# Patient Record
Sex: MALE | Race: BLACK OR AFRICAN AMERICAN | Employment: FULL TIME | URBAN - METROPOLITAN AREA
[De-identification: names, ages, dates, MRNs, and addresses within clinical notes are randomized per-mention and may not be internally consistent; named-entity substitution may affect disease eponyms.]

---

## 2022-05-28 ENCOUNTER — APPOINTMENT (OUTPATIENT)
Dept: CT IMAGING | Age: 32
End: 2022-05-28
Attending: STUDENT IN AN ORGANIZED HEALTH CARE EDUCATION/TRAINING PROGRAM
Payer: COMMERCIAL

## 2022-05-28 ENCOUNTER — HOSPITAL ENCOUNTER (EMERGENCY)
Age: 32
Discharge: HOME | End: 2022-05-28
Attending: STUDENT IN AN ORGANIZED HEALTH CARE EDUCATION/TRAINING PROGRAM
Payer: COMMERCIAL

## 2022-05-28 VITALS
RESPIRATION RATE: 14 BRPM | HEIGHT: 63 IN | TEMPERATURE: 98.1 F | WEIGHT: 135 LBS | HEART RATE: 64 BPM | SYSTOLIC BLOOD PRESSURE: 131 MMHG | DIASTOLIC BLOOD PRESSURE: 88 MMHG | OXYGEN SATURATION: 99 % | BODY MASS INDEX: 23.92 KG/M2

## 2022-05-28 DIAGNOSIS — R20.0 HAND NUMBNESS: Primary | ICD-10-CM

## 2022-05-28 LAB
ANION GAP SERPL CALC-SCNC: 7 MMOL/L (ref 5–15)
BUN SERPL-MCNC: 7 MG/DL (ref 6–20)
BUN/CREAT SERPL: 9 (ref 12–20)
CA-I BLD-MCNC: 9 MG/DL (ref 8.5–10.1)
CHLORIDE SERPL-SCNC: 103 MMOL/L (ref 97–108)
CO2 SERPL-SCNC: 27 MMOL/L (ref 21–32)
CREAT SERPL-MCNC: 0.82 MG/DL (ref 0.7–1.3)
GLUCOSE BLD STRIP.AUTO-MCNC: 103 MG/DL (ref 65–117)
GLUCOSE SERPL-MCNC: 99 MG/DL (ref 65–100)
PERFORMED BY, TECHID: NORMAL
POTASSIUM SERPL-SCNC: 3.9 MMOL/L (ref 3.5–5.1)
SODIUM SERPL-SCNC: 137 MMOL/L (ref 136–145)

## 2022-05-28 PROCEDURE — 36415 COLL VENOUS BLD VENIPUNCTURE: CPT

## 2022-05-28 PROCEDURE — 80048 BASIC METABOLIC PNL TOTAL CA: CPT

## 2022-05-28 PROCEDURE — 99283 EMERGENCY DEPT VISIT LOW MDM: CPT

## 2022-05-28 PROCEDURE — 82962 GLUCOSE BLOOD TEST: CPT

## 2022-05-28 NOTE — ED PROVIDER NOTES
[id number]  EMERGENCY DEPARTMENT ENCOUNTER NOTE    Date: 5/28/2022  Patient Name: Yvonne Montes    History of Presenting Illness     Chief Complaint   Patient presents with    Other     HPI: Yvonne Montes, 27 y.o. male with a past medical history and outpatient medications as listed and reviewed below  presents for bilateral hand and bilateral foot numbness that lasted for 50 minutes and resolved. He reports he was drinking alcohol and marijuana. After that, he had an episode of his tips of his fingers on both sides of his hands started going numb and the numbness spread to the mid forearm on both sides. Later, that resolved in 15 minutes. He also had numbness in the bilateral feet and toes at the same time that resolved. He did not have any headache, slurred speech, diplopia, chest pain, shortness of breath, palpitations, or weakness of her lower extremities. This event was witnessed by his friend who reports that the patient did not have any confusion or slurred speech. Medical History   I reviewed the medical, surgical, family, and social history, as well as allergies:    PCP: None    Past Medical History:  Past Medical History:   Diagnosis Date    Asthma      Past Surgical History:  History reviewed. No pertinent surgical history. Current Outpatient Medications:  No current outpatient medications   Family History:  History reviewed. No pertinent family history. Social History:  Social History     Tobacco Use    Smoking status: Current Every Day Smoker    Smokeless tobacco: Never Used   Substance Use Topics    Alcohol use: Yes     Comment: daily    Drug use: Yes     Types: Marijuana     Allergies:  No Known Allergies    Review of Systems     Review of Systems  Negative: All other systems negative. Physical Exam and Vital Signs   Vital Signs - Reviewed the patient's vital signs.     Patient Vitals for the past 12 hrs:   Temp Pulse Resp BP SpO2   05/28/22 0447 98.9 °F (37.2 °C) 72 18 (!) 140/83 97 %     Physical Exam:    GENERAL: awake, alert, cooperative, not in distress  HEENT:  * Pupils equal, EOMI  * Head atraumatic  CV:  * audible heart sounds  * warm and perfused extremities bilaterally  PULMONARY: Good air movement, no wheezes, no crackles  ABDOMEN/: soft, no distension, no guarding, no suprapubic tenderness, no abdominal tenderness  EXTREMITIES/BACK: warm and perfused, no tenderness, no edema  SKIN: no rashes or signs of trauma  NEURO:   * GCS = 15 (E=4, V=5, M=6)  * Awake, alert, oriented x 3, normal speech  * CNs II-XII intact  * Strength 5/5 in all extremities  * Sensory exam grossly normal  * No pronator drift or dysmetria      Medical Decision Making   - I am the first and primary provider for this patient and am the primary provider of record. - I reviewed the vital signs, available nursing notes, past medical history, past surgical history, family history and social history. - Initial assessment performed. The patients presenting problems have been discussed, and the staff are in agreement with the care plan formulated and outlined with them. I have encouraged them to ask questions as they arise throughout their visit. - Available medical records, nursing notes, old EKGs, and EMS run sheets (if patient was EMS transported) were reviewed    MDM:   Patient is a 27 y.o. male presenting for an episode of bilateral hand and foot numbness that resolved after 15 minutes without any other focal neurologic deficits in the setting of EtOH and marijuana use. Vitals reveal no significant abnormalities and physical exam reveals no significant abnormalities. Based on the history, physical exam, risk factors, and vitals signs, differential includes: Pression, intoxication, electrolyte normalities. No concern for stroke as the patient has normal neuro exam and reassuring presentation with an nonanemic history. Will initiate workup and symptomatic treatment. See ED Course and Reassessment for results and interpretations. Results     Labs:  Recent Results (from the past 12 hour(s))   GLUCOSE, POC    Collection Time: 05/28/22  4:53 AM   Result Value Ref Range    Glucose (POC) 103 65 - 117 mg/dL    Performed by Natividad DE SANTIAGO    METABOLIC PANEL, BASIC    Collection Time: 05/28/22  5:59 AM   Result Value Ref Range    Sodium 137 136 - 145 mmol/L    Potassium 3.9 3.5 - 5.1 mmol/L    Chloride 103 97 - 108 mmol/L    CO2 27 21 - 32 mmol/L    Anion gap 7 5 - 15 mmol/L    Glucose 99 65 - 100 mg/dL    BUN 7 6 - 20 mg/dL    Creatinine 0.82 0.70 - 1.30 mg/dL    BUN/Creatinine ratio 9 (L) 12 - 20      GFR est AA >60 >60 ml/min/1.73m2    GFR est non-AA >60 >60 ml/min/1.73m2    Calcium 9.0 8.5 - 10.1 mg/dL     Radiologic Studies:  CT Results  (Last 48 hours)    None        CXR Results  (Last 48 hours)    None        Medications ordered:  Medications - No data to display  ED Course and Reassessment     ED Course:     ED Course as of 05/28/22 0649   Sat May 28, 2022   0639 No significant electrolyte derangements. Creatinine is not elevated more than baseline range making JALEESA unlikely. [SS]      ED Course User Index  [SS] Ab Sosa MD       Reassessment:    After completion of evaluation and discussion of findings with the patient, all the patient's questions were answered. If required, all follow up appointments and treatments were discussed and explained. The patient sounded understanding and agrees with the plan. The patient understands that at this time there is no evidence for a more malignant underlying process, but the patient also understands that early in the process of an illness, an emergency department workup can be falsely reassuring. Routine discharge counseling was given to the patient and the patient understands that worsening, changing or persistent symptoms should prompt an immediate call or follow up with their primary physician or the emergency department. The importance of appropriate follow up was also discussed with the patient. More extensive discharge instructions were given in the patient's discharge paperwork. Final Disposition     Discharge: DISCHARGED FROM EMERGENCY DEPARTMENT    Patient will be discharged from the Emergency Department in stable condition. All of the diagnostic tests were reviewed and any questions were answered. Diagnosis, results, follow up if applicable, and return precautions were discussed. I have also put together printed discharge instructions for them that include: 1) educational information regarding their diagnosis, 2) how to care for their diagnosis at home, as well a 3) list of reasons why they would want to return to the ED prior to their follow-up appointment, should their condition change. Any labs or imaging done in the ED will be either printed with the discharge paperwork or available through [address] DISCHARGE PLAN:  1. There are no discharge medications for this patient. 2.   Follow-up Information     Follow up With Specialties Details Why [address] [facility] EMERGENCY DEPT Emergency Medicine Go to  If symptoms worsen [address] [facility] [address] [phone]        3. Return to ED if worse    4. There are no discharge medications for this patient. Diagnosis     Clinical Impression:   1. Hand numbness      Attestations:    Fadi Ram MD    Please note that this dictation was completed with [employer], the computer voice recognition software. Quite often unanticipated grammatical, syntax, homophones, and other interpretive errors are inadvertently transcribed by the computer software. Please disregard these errors. Please excuse any errors that have escaped final proofreading. Thank you.

## 2022-05-28 NOTE — DISCHARGE INSTRUCTIONS
Thank you! Thank you for allowing me to care for you in the emergency department. I sincerely hope that you are satisfied with your visit today. It is my goal to provide you with excellent care. Below you will find a list of your labs and imaging from your visit today. Should you have any questions regarding these results please do not hesitate to call the emergency department. Labs -     Recent Results (from the past 12 hour(s))   GLUCOSE, POC    Collection Time: 05/28/22  4:53 AM   Result Value Ref Range    Glucose (POC) 103 65 - 117 mg/dL    Performed by Helen DE SANTIAGO    METABOLIC PANEL, BASIC    Collection Time: 05/28/22  5:59 AM   Result Value Ref Range    Sodium 137 136 - 145 mmol/L    Potassium 3.9 3.5 - 5.1 mmol/L    Chloride 103 97 - 108 mmol/L    CO2 27 21 - 32 mmol/L    Anion gap 7 5 - 15 mmol/L    Glucose 99 65 - 100 mg/dL    BUN 7 6 - 20 mg/dL    Creatinine 0.82 0.70 - 1.30 mg/dL    BUN/Creatinine ratio 9 (L) 12 - 20      GFR est AA >60 >60 ml/min/1.73m2    GFR est non-AA >60 >60 ml/min/1.73m2    Calcium 9.0 8.5 - 10.1 mg/dL       Radiologic Studies -   No orders to display     CT Results  (Last 48 hours)      None          CXR Results  (Last 48 hours)      None               If you feel that you have not received excellent quality care or timely care, please ask to speak to the nurse manager. Please choose us in the future for your continued health care needs. ------------------------------------------------------------------------------------------------------------  The exam and treatment you received in the Emergency Department were for an urgent problem and are not intended as complete care. It is important that you follow-up with a doctor, nurse practitioner, or physician assistant to:  (1) confirm your diagnosis,  (2) re-evaluation of changes in your illness and treatment, and  (3) for ongoing care.   If your symptoms become worse or you do not improve as expected and you are unable to reach your usual health care provider, you should return to the Emergency Department. We are available 24 hours a day. Please take your discharge instructions with you when you go to your follow-up appointment. If a prescription has been provided, please have it filled as soon as possible to prevent a delay in treatment. Read the entire medication instruction sheet provided to you by the pharmacy. If you have any questions or reservations about taking the medication due to side effects or interactions with other medications, please call your primary care physician or contact the ER to speak with the charge nurse. Make an appointment with your family doctor or the physician you were referred to for follow-up of this visit as instructed on your discharge paperwork, as this is a mandatory follow-up. Return to the ER if you are unable to be seen or if you are unable to be seen in a timely manner. If you have any problem arranging the follow-up visit, contact the Emergency Department immediately.

## 2022-05-28 NOTE — ED TRIAGE NOTES
States he had 2 episodes of hands and feet going numb. States he has returned to baseline.  Pt appears jittery

## 2022-05-28 NOTE — ED NOTES
Pt provided with d/c instructions at this time. Pt verbalizes understanding of d/c instructions. Pt in no apparent distress at time of d/c. Pt ambulatory at time of d/c.